# Patient Record
Sex: MALE | Race: BLACK OR AFRICAN AMERICAN | NOT HISPANIC OR LATINO | Employment: FULL TIME | URBAN - METROPOLITAN AREA
[De-identification: names, ages, dates, MRNs, and addresses within clinical notes are randomized per-mention and may not be internally consistent; named-entity substitution may affect disease eponyms.]

---

## 2018-01-16 ENCOUNTER — OFFICE VISIT (OUTPATIENT)
Dept: FAMILY MEDICINE CLINIC | Facility: CLINIC | Age: 23
End: 2018-01-16

## 2018-01-16 VITALS
BODY MASS INDEX: 23.38 KG/M2 | HEART RATE: 64 BPM | WEIGHT: 188 LBS | DIASTOLIC BLOOD PRESSURE: 78 MMHG | SYSTOLIC BLOOD PRESSURE: 128 MMHG | TEMPERATURE: 97.6 F | RESPIRATION RATE: 16 BRPM | OXYGEN SATURATION: 97 % | HEIGHT: 75 IN

## 2018-01-16 DIAGNOSIS — S39.012A BACK STRAIN, INITIAL ENCOUNTER: Primary | ICD-10-CM

## 2018-01-16 PROCEDURE — 99203 OFFICE O/P NEW LOW 30 MIN: CPT | Performed by: FAMILY MEDICINE

## 2018-01-16 RX ORDER — IBUPROFEN 200 MG
200 TABLET ORAL
COMMUNITY
End: 2018-05-08

## 2018-01-16 NOTE — PROGRESS NOTES
"Subjective   Teto Durand is a 22 y.o. male.     History of Present Illness   Chief Complaint:   Chief Complaint   Patient presents with   • Shoulder Pain     Establish care as a new patient       Teto Durand 22 y.o. male who presents today to establish care as a new patient. I reviewed all medical history, surgical history and family history. He complains of right shoulder pain that has been present for 3-4 months. He denies injury. His pain is a 2-3 out of 10 in the office today.  He points to right scapula   When he stands       View from behind back  Appears to be  Off balance looking back   ? Muscle imbalance   Some pain right scapula      From  Right  Shoulder     he has a problem list of   Patient Active Problem List   Diagnosis   • Tear of meniscus of knee   .  Since the last visit, he has overall felt well.  he has been compliant with   Current Outpatient Prescriptions:   •  ibuprofen (ADVIL,MOTRIN) 200 MG tablet, Take 200 mg by mouth., Disp: , Rfl: .  he denies medication side effects.    All of the chronic condition(s) listed above are stable w/o issues.    /78  Pulse 64  Temp 97.6 °F (36.4 °C) (Oral)   Resp 16  Ht 190.5 cm (75\")  Wt 85.3 kg (188 lb)  SpO2 97%  BMI 23.5 kg/m2    No results found for this or any previous visit.    The following portions of the patient's history were reviewed and updated as appropriate: allergies, current medications, past family history, past medical history, past social history, past surgical history and problem list.    Review of Systems   Constitutional: Negative for activity change, appetite change and unexpected weight change.   Eyes: Negative for visual disturbance.   Respiratory: Negative for chest tightness and shortness of breath.    Cardiovascular: Negative for chest pain and palpitations.   Musculoskeletal:        Shoulder pain-right side   Skin: Negative for color change.   Neurological: Negative for syncope and speech difficulty. "   Psychiatric/Behavioral: Negative for confusion and decreased concentration.       Objective   Physical Exam   Constitutional: He is oriented to person, place, and time.   HENT:   Mouth/Throat: Oropharynx is clear and moist.   Neck  From   Good = strength arms    Good    Eyes: Pupils are equal, round, and reactive to light.   Neck: Normal range of motion. Neck supple. No thyromegaly present.   Cardiovascular: Normal rate.    Musculoskeletal: Normal range of motion. He exhibits no tenderness.   ? Muscle imbalance post back   Neurological: He is alert and oriented to person, place, and time. He has normal reflexes.   Skin: No rash noted.   Psychiatric: He has a normal mood and affect. His behavior is normal.       Assessment/Plan   Teto was seen today for shoulder pain.    Diagnoses and all orders for this visit:    Back strain, initial encounter  -     Ambulatory Referral to Orthopedic Surgery

## 2018-01-16 NOTE — PATIENT INSTRUCTIONS
Exercise 30 minutes most days of the week  Sleep 6-8 hours each night if possible  Low fat, low cholesterol diet   we discussed prescribed medications and how to take them   make sure you get results of any labs/studies ordered today  Low glycemic index diet     See dr reyes for consult

## 2018-01-19 ENCOUNTER — OFFICE VISIT (OUTPATIENT)
Dept: ORTHOPEDIC SURGERY | Facility: CLINIC | Age: 23
End: 2018-01-19

## 2018-01-19 VITALS — WEIGHT: 185 LBS | TEMPERATURE: 98.3 F | BODY MASS INDEX: 22.53 KG/M2 | HEIGHT: 76 IN

## 2018-01-19 DIAGNOSIS — M54.2 NECK PAIN: ICD-10-CM

## 2018-01-19 DIAGNOSIS — G89.29 CHRONIC RIGHT SHOULDER PAIN: Primary | ICD-10-CM

## 2018-01-19 DIAGNOSIS — M25.511 CHRONIC RIGHT SHOULDER PAIN: Primary | ICD-10-CM

## 2018-01-19 PROCEDURE — 73030 X-RAY EXAM OF SHOULDER: CPT | Performed by: ORTHOPAEDIC SURGERY

## 2018-01-19 PROCEDURE — 99203 OFFICE O/P NEW LOW 30 MIN: CPT | Performed by: ORTHOPAEDIC SURGERY

## 2018-01-19 NOTE — PROGRESS NOTES
New patient or new problem visit    Chief Complaint   Patient presents with   • Right Shoulder - Pain       HPI: He complains primarily of right shoulder pain but sometimes neck and interscapular pain which is been going on 3 months and is constant aching and associated with clicking in the shoulder.  May have injured it lifting he states.    PFSH: See chart- reviewed    Review of Systems   Constitutional: Negative for chills, fever and unexpected weight change.   HENT: Negative for trouble swallowing and voice change.    Eyes: Negative for visual disturbance.   Respiratory: Negative for cough and shortness of breath.    Cardiovascular: Negative for chest pain and leg swelling.   Gastrointestinal: Negative for abdominal pain, nausea and vomiting.   Endocrine: Negative for cold intolerance and heat intolerance.   Genitourinary: Negative for difficulty urinating, frequency and urgency.   Musculoskeletal: Positive for myalgias.   Skin: Negative for rash and wound.   Allergic/Immunologic: Negative for immunocompromised state.   Neurological: Negative for weakness and numbness.   Hematological: Does not bruise/bleed easily.   Psychiatric/Behavioral: Negative for dysphoric mood. The patient is not nervous/anxious.        PE: On exam some subacromial mild tenderness and equivocal impingement for stable range of motion with minimal crepitus in the right shoulder.  Neurologic function the upper extremity symmetrically intact for motor sensory and reflex testing.  The neck is mildly tender in the right trapezial area.      MEDICAL DECISION MAKING    XRAY: In film x-rays of the shoulder are normal may be slight elevation of the right clavicle.  No comparison views are available.    Other: n/a    Impression: I suspect he has chronic the shoulder separations bilaterally at the before meals joint but I don't think this is the source of his pain that think he has some rotator cuff tendinitis/impingement for which I recommended  exercises.  If he fails to improve then I'll see him back in a month.    Plan: 6

## 2018-04-03 ENCOUNTER — OFFICE VISIT (OUTPATIENT)
Dept: FAMILY MEDICINE CLINIC | Facility: CLINIC | Age: 23
End: 2018-04-03

## 2018-04-03 VITALS
HEIGHT: 76 IN | SYSTOLIC BLOOD PRESSURE: 112 MMHG | TEMPERATURE: 98.2 F | OXYGEN SATURATION: 98 % | WEIGHT: 188 LBS | BODY MASS INDEX: 22.89 KG/M2 | RESPIRATION RATE: 16 BRPM | HEART RATE: 58 BPM | DIASTOLIC BLOOD PRESSURE: 60 MMHG

## 2018-04-03 DIAGNOSIS — G89.29 CHRONIC RIGHT SHOULDER PAIN: Primary | ICD-10-CM

## 2018-04-03 DIAGNOSIS — Z00.00 LABORATORY EXAMINATION ORDERED AS PART OF A ROUTINE GENERAL MEDICAL EXAMINATION: ICD-10-CM

## 2018-04-03 DIAGNOSIS — R63.4 WEIGHT LOSS: ICD-10-CM

## 2018-04-03 DIAGNOSIS — B36.0 TINEA VERSICOLOR: ICD-10-CM

## 2018-04-03 DIAGNOSIS — D70.9 NEUTROPENIA, UNSPECIFIED TYPE (HCC): ICD-10-CM

## 2018-04-03 DIAGNOSIS — M25.511 CHRONIC RIGHT SHOULDER PAIN: Primary | ICD-10-CM

## 2018-04-03 DIAGNOSIS — M25.551 RIGHT HIP PAIN: ICD-10-CM

## 2018-04-03 PROCEDURE — 99214 OFFICE O/P EST MOD 30 MIN: CPT | Performed by: PHYSICIAN ASSISTANT

## 2018-04-03 RX ORDER — KETOCONAZOLE 20 MG/ML
SHAMPOO TOPICAL
Qty: 120 ML | Refills: 5 | Status: SHIPPED | OUTPATIENT
Start: 2018-04-03 | End: 2018-05-08

## 2018-04-03 NOTE — PROGRESS NOTES
Subjective   Teto Durand is a 22 y.o. male.     History of Present Illness   Teot Durand 22 y.o. male who presents today for routine follow up check and medication refills.  he has a history of   Patient Active Problem List   Diagnosis   • Tear of meniscus of knee   .  Since the last visit, he has overall felt fairly well.  He has ortho pain and did see DR. Tanner Mazariegos for shoulder pain and wants to see Dr. Buck..  he has been compliant with current medications have reviewed them.  The patient denies medication side effects.    Having shoulder and hip pain; this is making his back hurt and refer to ortho  Weight is down 20lbs in last 2 years;   He did stop basketball and weight lifting prior to this and has been eating less.   Biceps smaller ? Right upper ext    No results found for this or any previous visit.    Weight up 3 lbs since last weight    See me one month if cont. Weight loss  The following portions of the patient's history were reviewed and updated as appropriate: allergies, current medications, past family history, past medical history, past social history, past surgical history and problem list.    Review of Systems   Constitutional: Positive for unexpected weight change. Negative for activity change and appetite change.   HENT: Negative for nosebleeds and trouble swallowing.    Eyes: Negative for pain and visual disturbance.   Respiratory: Negative for chest tightness, shortness of breath and wheezing.    Cardiovascular: Negative for chest pain and palpitations.   Gastrointestinal: Negative for abdominal pain and blood in stool.   Endocrine: Negative.    Genitourinary: Negative for difficulty urinating and hematuria.   Musculoskeletal: Positive for back pain, neck pain and neck stiffness. Negative for joint swelling.   Skin: Positive for rash. Negative for color change.   Allergic/Immunologic: Negative.    Neurological: Negative for syncope and speech difficulty.   Hematological: Negative for  adenopathy.   Psychiatric/Behavioral: Positive for sleep disturbance. Negative for agitation and confusion.   All other systems reviewed and are negative.      Objective   Physical Exam   Constitutional: He is oriented to person, place, and time. He appears well-developed and well-nourished. No distress.   HENT:   Head: Normocephalic and atraumatic.   Right Ear: External ear normal.   Left Ear: External ear normal.   Nose: Nose normal.   Mouth/Throat: Oropharynx is clear and moist. No oropharyngeal exudate.   Eyes: Conjunctivae and EOM are normal. Pupils are equal, round, and reactive to light. No scleral icterus.   Neck: Normal range of motion. Neck supple. No thyromegaly present.   Cardiovascular: Normal rate, regular rhythm, normal heart sounds and intact distal pulses.    No murmur heard.  Pulmonary/Chest: Effort normal and breath sounds normal. No respiratory distress. He has no wheezes. He has no rales.   Abdominal: Soft. Bowel sounds are normal. He exhibits no distension and no mass. There is no tenderness. There is no guarding.   Musculoskeletal: Normal range of motion. He exhibits no deformity.   Lymphadenopathy:     He has no cervical adenopathy.   Neurological: He is alert and oriented to person, place, and time. He has normal reflexes. Coordination normal.   Skin: Skin is warm and dry. Rash noted.   Hyperpigmented macules on chest and back   Psychiatric: He has a normal mood and affect. His behavior is normal. Judgment and thought content normal.   Nursing note and vitals reviewed.      Assessment/Plan   Teto was seen today for hip pain.    Diagnoses and all orders for this visit:    Chronic right shoulder pain    Right hip pain    Weight loss

## 2018-04-03 NOTE — PATIENT INSTRUCTIONS
Low glycemic index diet  Exercise 30 minutes most days of the week  Make sure you get results on any labs or tests we ordered today  We discussed medications and how to take them as prescribed  Sleep 6-8 hours each night if possible  If you have not signed up for [employer], please activate your code ASAP from your After Visit Summary today    LDL goal <100  LDL goal if heart disease <70  HDL goal >60  Triglyceride goal <150  BP goal =<130/80  Fasting glucose <100    Tinea Versicolor  Tinea versicolor is a common fungal infection of the skin. It causes a rash that appears as light or dark patches on the skin. The rash most often occurs on the chest, back, neck, or upper arms. This condition is more common during warm weather.  Other than affecting how your skin looks, tinea versicolor usually does not cause other problems. In most cases, the infection goes away in a few weeks with treatment. It may take a few months for the patches on your skin to clear up.  What are the causes?  Tinea versicolor occurs when a type of fungus that is normally present on the skin starts to overgrow. This fungus is a kind of yeast. The exact cause of the overgrowth is not known. This condition cannot be passed from one person to another (noncontagious).  What increases the risk?  This condition is more likely to develop when certain factors are present, such as:  · Heat and humidity.  · Sweating too much.  · Hormone changes.  · Oily skin.  · A weak defense (immune) system.  What are the signs or symptoms?  Symptoms of this condition may include:  · A rash on your skin that is made up of light or dark patches. The rash may have:  ¨ Patches of tan or pink spots on light skin.  ¨ Patches of white or brown spots on dark skin.  ¨ Patches of skin that do not tan.  ¨ Well-marked edges.  ¨ Scales on the discolored areas.  · Mild itching.  How is this diagnosed?  A health care provider can usually diagnose this condition by looking at your skin.  During the exam, he or she may use ultraviolet light to help determine the extent of the infection. In some cases, a skin sample may be taken by scraping the rash. This sample will be viewed under a microscope to check for yeast overgrowth.  How is this treated?  Treatment for this condition may include:  · Dandruff shampoo that is applied to the affected skin during showers or bathing.  · Over-the-counter medicated skin cream, lotion, or soaps.  · Prescription antifungal medicine in the form of skin cream or pills.  · Medicine to help reduce itching.  Follow these instructions at home:  · Take medicines only as directed by your health care provider.  · Apply dandruff shampoo to the affected area if told to do so by your health care provider. You may be instructed to scrub the affected skin for several minutes each day.  · Do not scratch the affected area of skin.  · Avoid hot and humid conditions.  · Do not use tanning booths.  · Try to avoid sweating a lot.  Contact a health care provider if:  · Your symptoms get worse.  · You have a fever.  · You have redness, swelling, or pain at the site of your rash.  · You have fluid, blood, or pus coming from your rash.  · Your rash returns after treatment.  This information is not intended to replace advice given to you by your health care provider. Make sure you discuss any questions you have with your health care provider.  Document Released: 12/15/2001 Document Revised: 08/20/2017 Document Reviewed: 09/29/2015  [employer] Interactive Patient Education © 2017 [employer]

## 2018-04-04 LAB
ALBUMIN SERPL-MCNC: 4.3 G/DL (ref 3.5–5.2)
ALBUMIN/GLOB SERPL: 1.5 G/DL
ALP SERPL-CCNC: 96 U/L (ref 39–117)
ALT SERPL-CCNC: 22 U/L (ref 1–41)
APPEARANCE UR: CLEAR
AST SERPL-CCNC: 18 U/L (ref 1–40)
BACTERIA #/AREA URNS HPF: ABNORMAL /HPF
BASOPHILS # BLD AUTO: 0.02 10*3/MM3 (ref 0–0.2)
BASOPHILS NFR BLD AUTO: 0.6 % (ref 0–1.5)
BILIRUB SERPL-MCNC: 0.2 MG/DL (ref 0.1–1.2)
BILIRUB UR QL STRIP: NEGATIVE
BUN SERPL-MCNC: 13 MG/DL (ref 6–20)
BUN/CREAT SERPL: 12.3 (ref 7–25)
CALCIUM SERPL-MCNC: 9.8 MG/DL (ref 8.6–10.5)
CASTS URNS MICRO: ABNORMAL
CHLORIDE SERPL-SCNC: 104 MMOL/L (ref 98–107)
CHOLEST SERPL-MCNC: 156 MG/DL (ref 0–200)
CO2 SERPL-SCNC: 28.6 MMOL/L (ref 22–29)
COLOR UR: YELLOW
CREAT SERPL-MCNC: 1.06 MG/DL (ref 0.76–1.27)
EOSINOPHIL # BLD AUTO: 0.06 10*3/MM3 (ref 0–0.7)
EOSINOPHIL NFR BLD AUTO: 1.7 % (ref 0.3–6.2)
EPI CELLS #/AREA URNS HPF: ABNORMAL /HPF
ERYTHROCYTE [DISTWIDTH] IN BLOOD BY AUTOMATED COUNT: 13.5 % (ref 11.5–14.5)
FOLATE SERPL-MCNC: 16.01 NG/ML (ref 4.78–24.2)
GFR SERPLBLD CREATININE-BSD FMLA CKD-EPI: 107 ML/MIN/1.73
GFR SERPLBLD CREATININE-BSD FMLA CKD-EPI: 88 ML/MIN/1.73
GLOBULIN SER CALC-MCNC: 2.9 GM/DL
GLUCOSE SERPL-MCNC: 96 MG/DL (ref 65–99)
GLUCOSE UR QL: NEGATIVE
HAV IGM SERPL QL IA: NEGATIVE
HBV CORE IGM SERPL QL IA: NEGATIVE
HBV SURFACE AG SERPL QL IA: NEGATIVE
HCT VFR BLD AUTO: 42.4 % (ref 40.4–52.2)
HCV AB S/CO SERPL IA: <0.1 S/CO RATIO (ref 0–0.9)
HDLC SERPL-MCNC: 46 MG/DL (ref 40–60)
HGB BLD-MCNC: 14 G/DL (ref 13.7–17.6)
HGB UR QL STRIP: NEGATIVE
HIV 1+2 AB+HIV1 P24 AG SERPL QL IA: NON REACTIVE
IMM GRANULOCYTES # BLD: 0 10*3/MM3 (ref 0–0.03)
IMM GRANULOCYTES NFR BLD: 0 % (ref 0–0.5)
KETONES UR QL STRIP: NEGATIVE
LDLC SERPL CALC-MCNC: 97 MG/DL (ref 0–100)
LEUKOCYTE ESTERASE UR QL STRIP: NEGATIVE
LYMPHOCYTES # BLD AUTO: 1.84 10*3/MM3 (ref 0.9–4.8)
LYMPHOCYTES NFR BLD AUTO: 53.5 % (ref 19.6–45.3)
MCH RBC QN AUTO: 30.4 PG (ref 27–32.7)
MCHC RBC AUTO-ENTMCNC: 33 G/DL (ref 32.6–36.4)
MCV RBC AUTO: 92.2 FL (ref 79.8–96.2)
MONOCYTES # BLD AUTO: 0.32 10*3/MM3 (ref 0.2–1.2)
MONOCYTES NFR BLD AUTO: 9.3 % (ref 5–12)
NEUTROPHILS # BLD AUTO: 1.2 10*3/MM3 (ref 1.9–8.1)
NEUTROPHILS NFR BLD AUTO: 34.9 % (ref 42.7–76)
NITRITE UR QL STRIP: NEGATIVE
PH UR STRIP: 6 [PH] (ref 5–8)
PLATELET # BLD AUTO: 240 10*3/MM3 (ref 140–500)
POTASSIUM SERPL-SCNC: 4.6 MMOL/L (ref 3.5–5.2)
PROT SERPL-MCNC: 7.2 G/DL (ref 6–8.5)
PROT UR QL STRIP: (no result)
RBC # BLD AUTO: 4.6 10*6/MM3 (ref 4.6–6)
RBC #/AREA URNS HPF: ABNORMAL /HPF
SODIUM SERPL-SCNC: 144 MMOL/L (ref 136–145)
SP GR UR: 1.03 (ref 1–1.03)
T3FREE SERPL-MCNC: 2.8 PG/ML (ref 2–4.4)
T4 FREE SERPL-MCNC: 1.26 NG/DL (ref 0.93–1.7)
TRIGL SERPL-MCNC: 63 MG/DL (ref 0–150)
TSH SERPL DL<=0.005 MIU/L-ACNC: 2.23 MIU/ML (ref 0.27–4.2)
UROBILINOGEN UR STRIP-MCNC: (no result) MG/DL
VIT B12 SERPL-MCNC: 650 PG/ML (ref 211–946)
VLDLC SERPL CALC-MCNC: 12.6 MG/DL (ref 5–40)
WBC # BLD AUTO: 3.44 10*3/MM3 (ref 4.5–10.7)
WBC #/AREA URNS HPF: ABNORMAL /HPF

## 2018-04-19 ENCOUNTER — TELEPHONE (OUTPATIENT)
Dept: ORTHOPEDIC SURGERY | Facility: CLINIC | Age: 23
End: 2018-04-19

## 2018-05-08 ENCOUNTER — OFFICE VISIT (OUTPATIENT)
Dept: ORTHOPEDIC SURGERY | Facility: CLINIC | Age: 23
End: 2018-05-08

## 2018-05-08 VITALS — BODY MASS INDEX: 22.89 KG/M2 | WEIGHT: 188 LBS | TEMPERATURE: 97.5 F | HEIGHT: 76 IN

## 2018-05-08 DIAGNOSIS — IMO0002 BURSITIS/TENDONITIS, SHOULDER: Primary | ICD-10-CM

## 2018-05-08 PROCEDURE — 99214 OFFICE O/P EST MOD 30 MIN: CPT | Performed by: ORTHOPAEDIC SURGERY

## 2018-05-08 NOTE — PROGRESS NOTES
"Right Shoulder Follow Up      Patient: Teto Durand        YOB: 1995            Chief Complaints:right Shoulder pain  Chief Complaint   Patient presents with   • Right Shoulder - Pain, Follow-up   • Cervical Spine - Pain, Follow-up         History of Present Illness:This is a 22-year-old right-hand dominant young man in presents complaining of right shoulder pain is been ongoing 5 months no one history injury change in activity he has been lifting some is unsure if that's related.  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  Taken occasional ibuprofen he has significant night pain which is his biggest complaint he has seen Dr. Griffin who did not feel like it was significantly neck and wanted his shoulder evaluated.  His symptoms are constant severe aching with clicking popping snapping past medical history is unremarkable 14 point review      Physical Exam: 22 y.o. male  General Appearance:    Alert, cooperative, in no acute distress                   Vitals:    05/08/18 1251   Temp: 97.5 °F (36.4 °C)   Weight: 85.3 kg (188 lb)   Height: 193 cm (76\")        Patient is alert and read ×3 no acute distress appears her above-listed at height weight and age.  Affect is normal respiratory rate is normal unlabored. Heart rate regular rate rhythm, sclera, dentition and hearing are normal for the purpose of this exam.      Ortho Exam              Assessment/Plan:                "

## 2018-05-09 NOTE — PROGRESS NOTES
New Shoulder      Patient: Teto Durand        YOB: 1995    Medical Record Number: [id number]        Chief Complaints: Right shoulder pain      History of Present Illness:   History of Present Illness:This is a 22-year-old right-hand dominant young man in presents complaining of right shoulder pain is been ongoing 5 months no one history injury change in activity he has been lifting some is unsure if that's related.  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  Taken occasional ibuprofen he has significant night pain which is his biggest complaint he has seen Dr. Griffin who did not feel like it was significantly neck and wanted his shoulder evaluated.  His symptoms are constant severe aching with clicking popping snapping past medical history is unremarkable 14 point review        Allergies: No Known Allergies    Medications:   Home Medications:  No current outpatient prescriptions on file prior to visit.     No current facility-administered medications on file prior to visit.      Current Medications:  Scheduled Meds:  Continuous Infusions:  No current facility-administered medications for this visit.   PRN Meds:.    History reviewed. No pertinent past medical history.     Past Surgical History:   Procedure Laterality Date   • KNEE CARTILAGE SURGERY Right    • KNEE SURGERY          Social History     Occupational History   • Not on file.     Social History Main Topics   • Smoking status: Never Smoker   • Smokeless tobacco: Never Used   • Alcohol use Yes   • Drug use: Unknown   • Sexual activity: Defer    Social History     Social History Narrative   • No narrative on file      History reviewed. No pertinent family history.          Review of Systems: 14 point review of systems are remarkable for the pertinent positives listed in the chart by the patient the remainder are negative.  He's had as had recently had  "blood in his urine which is currently being worked up.    Review of Systems      Physical Exam: 22 y.o. male  General Appearance:    Alert, cooperative, in no acute distress                 Vitals:    05/08/18 1251   Temp: 97.5 °F (36.4 °C)   Weight: 85.3 kg (188 lb)   Height: 193 cm (76\")      Patient is alert and read ×3 no acute distress appears her above-listed at height weight and age.  Affect is normal respiratory rate is normal unlabored. Heart rate regular rate rhythm, sclera, dentition and hearing are normal for the purpose of this exam.    Ortho Exam Physical exam of the right shoulder reveals no overlying skin changes no lymphedema no lymphadenopathy.  Patient has active flexion 180 with mild symptoms abduction is similar external rotation is to 50 and internal rotation to the upper lumbar spine with mild symptoms.  Patient has good rotator cuff strength 4+ over 5 with isometric strength testing with pain.  Patient has a positive impingement and a positive Garza sign.  Patient has good cervical range of motion which is full and asymptomatic no radicular symptoms.  Patient has a normal elbow exam.  Good distal pulses are present  Patient has pain with overhead activity and a positive Neer sign and a positive empty can sign , a positive drop arm and a definitive painful arc  He has extremely poor scapular stabilization and continually is moving his shoulder popping his neck and do an odd things that he states is trying to \"pop in his shoulder and his neck\"  Procedures          Radiology:   AP, Scapular Y and Axillary Lateral of the right shoulder were ordered/reviewed to evauate shoulder pain.  I've no comparative films these were taken by Dr. Griffin these show a slightly high riding clavicle with respect to the acromium no acute bony pathology  Imaging Results (most recent)     None        Assessment/Plan:Right shoulder pain I think this is secondary impingement due to extremely poor scapular " stabilization and very poor dynamics and biomechanics of the shoulder plan is to proceed with physical therapy but I would also like to get an MRI in view of his severe night pain  If we fail to improve his symptoms addressing his shoulder I would have him see Dr. Griffin again

## 2018-05-14 ENCOUNTER — APPOINTMENT (OUTPATIENT)
Dept: PHYSICAL THERAPY | Facility: HOSPITAL | Age: 23
End: 2018-05-14
Attending: ORTHOPAEDIC SURGERY

## 2018-05-16 ENCOUNTER — HOSPITAL ENCOUNTER (OUTPATIENT)
Dept: MRI IMAGING | Facility: HOSPITAL | Age: 23
Discharge: HOME | End: 2018-05-16
Attending: ORTHOPAEDIC SURGERY | Admitting: ORTHOPAEDIC SURGERY

## 2018-05-16 DIAGNOSIS — IMO0002 BURSITIS/TENDONITIS, SHOULDER: ICD-10-CM

## 2018-05-16 PROCEDURE — 73221 MRI JOINT UPR EXTREM W/O DYE: CPT

## 2018-05-21 DIAGNOSIS — M54.2 NECK PAIN: Primary | ICD-10-CM

## 2018-07-17 ENCOUNTER — OFFICE VISIT (OUTPATIENT)
Dept: ORTHOPEDIC SURGERY | Facility: CLINIC | Age: 23
End: 2018-07-17

## 2018-07-17 VITALS — BODY MASS INDEX: 22.65 KG/M2 | WEIGHT: 186 LBS | TEMPERATURE: 99.5 F | HEIGHT: 76 IN

## 2018-07-17 DIAGNOSIS — M25.551 BILATERAL HIP PAIN: Primary | ICD-10-CM

## 2018-07-17 DIAGNOSIS — M25.552 BILATERAL HIP PAIN: Primary | ICD-10-CM

## 2018-07-17 PROCEDURE — 99213 OFFICE O/P EST LOW 20 MIN: CPT | Performed by: ORTHOPAEDIC SURGERY

## 2018-07-17 PROCEDURE — 73521 X-RAY EXAM HIPS BI 2 VIEWS: CPT | Performed by: ORTHOPAEDIC SURGERY

## 2018-07-17 NOTE — PROGRESS NOTES
"Patient Name: Teto Durand   YOB: 1995  Referring Primary Care Physician: Jignesh Saez MD  BMI: Body mass index is 22.64 kg/m².    Chief Complaint:    Chief Complaint   Patient presents with   • Right Hip - Establish Care, Pain    left hip    Subjective:    HPI:   Teto Durand is a pleasant 23 y.o. year old who presents today for evaluation of   Chief Complaint   Patient presents with   • Right Hip - Establish Care, Pain    (patient had CT at urology and spotted lesion in the left femoral neck.  No prior knowledge.  No night pain, trauma or previous hx he can think of.  Has left more than right \"hip\" pain but points to his posterior ilium and low back.  Athlete in high school.  In school now.  No meds etc and was incidental finding  This problem is new to this examiner.     Medications:   Home Medications:  No current outpatient prescriptions on file prior to visit.     No current facility-administered medications on file prior to visit.      Current Medications:  Scheduled Meds:  Continuous Infusions:  No current facility-administered medications for this visit.   PRN Meds:.    I have reviewed the patient's medical history in detail and updated the computerized patient record.  Review and summarization of old records includes:    History reviewed. No pertinent past medical history.     Past Surgical History:   Procedure Laterality Date   • KNEE CARTILAGE SURGERY Right    • KNEE SURGERY          Social History     Occupational History   • Not on file.     Social History Main Topics   • Smoking status: Never Smoker   • Smokeless tobacco: Never Used   • Alcohol use Yes   • Drug use: Unknown   • Sexual activity: Defer    Social History     Social History Narrative   • No narrative on file      History reviewed. No pertinent family history.    ROS: 14 point review of systems was performed and all other systems were reviewed and are negative except for documented findings in HPI and today's encounter. " "    Allergies: No Known Allergies  Constitutional:  Denies fever, shaking or chills   Eyes:  Denies change in visual acuity   HENT:  Denies nasal congestion or sore throat   Respiratory:  Denies cough or shortness of breath   Cardiovascular:  Denies chest pain or severe LE edema   GI:  Denies abdominal pain, nausea, vomiting, bloody stools or diarrhea   Musculoskeletal:  Numbness, tingling, pain, or loss of motor function only as noted above in history of present illness.  : Denies painful urination or hematuria  Integument:  Denies rash, lesion or ulceration   Neurologic:  Denies headache or focal weakness  Endocrine:  Denies lymphadenopathy  Psych:  Denies confusion or change in mental status   Hem:  Denies active bleeding    Subjective     Objective:    Physical Exam: 23 y.o. male  Wt Readings from Last 3 Encounters:   07/17/18 84.4 kg (186 lb)   05/16/18 77.1 kg (170 lb)   05/08/18 85.3 kg (188 lb)     Ht Readings from Last 3 Encounters:   07/17/18 193 cm (76\")   05/16/18 184 cm (72.44\")   05/08/18 193 cm (76\")     Body mass index is 22.64 kg/m².    Vitals:    07/17/18 1129   Temp: 99.5 °F (37.5 °C)       Vital signs reviewed.   General Appearance:    Alert, cooperative, in no acute distress                  Eyes: conjunctiva clear  ENT: external ears and nose atraumatic  CV: no peripheral edema  Resp: normal respiratory effort  Skin: no rashes or wounds; normal turgor  Psych: mood and affect appropriate  Lymph: no nodes appreciated  Neuro: gross sensation intact  Vascular:  Palpable peripheral pulse in noted extremity  Musculoskeletal Extremities: benson hi stenchfield neg, good rom with slight stiffness to roattion left more than right, distal nvi, FADIR neg.  neg to palp, reports no night pain  Viewed CT from urology that shows same.    Radiology:   Imaging done today and discussed at length with the patient:    Indication: pain related symptoms,  Views: 2V AP&LAT bilateral hip(s)   Findings: round smooth " large nonexpansile lesions in the left femoral neck region; ? lucency on the right.    Comparison views: not available      Assessment:     ICD-10-CM ICD-9-CM   1. Bilateral hip pain M25.551 719.45    M25.552         Procedures       Plan: Biomechanics of pertinent body area discussed.  Risks, benefits, alternatives, comparisons, and complications of accepted medicines, injections, recommendations, surgical procedures, and therapies explained and education provided in laymen's terms. The patient was given the opportunity to ask questions and they were answerved to their satisfaction.   Natural history and expected course of this patient's diagnosis discussed along with evaluation of therapies. Questions answered.  MRI.with and without benson hips to assess.  Told them may require more workup/ treatments depending on the findings        7/17/2018

## 2018-07-30 ENCOUNTER — HOSPITAL ENCOUNTER (OUTPATIENT)
Dept: MRI IMAGING | Facility: HOSPITAL | Age: 23
Discharge: HOME | End: 2018-07-30
Attending: ORTHOPAEDIC SURGERY | Admitting: ORTHOPAEDIC SURGERY

## 2018-07-30 ENCOUNTER — HOSPITAL ENCOUNTER (OUTPATIENT)
Dept: MRI IMAGING | Facility: HOSPITAL | Age: 23
Discharge: HOME | End: 2018-07-30
Attending: ORTHOPAEDIC SURGERY

## 2018-07-30 DIAGNOSIS — M25.551 BILATERAL HIP PAIN: ICD-10-CM

## 2018-07-30 DIAGNOSIS — M25.552 BILATERAL HIP PAIN: ICD-10-CM

## 2018-07-30 PROCEDURE — 73723 MRI JOINT LWR EXTR W/O&W/DYE: CPT

## 2018-07-30 PROCEDURE — A9577 INJ MULTIHANCE: HCPCS | Performed by: ORTHOPAEDIC SURGERY

## 2018-07-30 PROCEDURE — 0 GADOBENATE DIMEGLUMINE 529 MG/ML SOLUTION: Performed by: ORTHOPAEDIC SURGERY

## 2018-07-30 RX ADMIN — GADOBENATE DIMEGLUMINE 17 ML: 529 INJECTION, SOLUTION INTRAVENOUS at 13:01

## 2018-08-01 DIAGNOSIS — M25.552 LEFT HIP PAIN: Primary | ICD-10-CM

## 2018-08-01 PROBLEM — M25.551 BILATERAL HIP PAIN: Status: ACTIVE | Noted: 2018-08-01

## 2018-11-07 ENCOUNTER — TELEPHONE (OUTPATIENT)
Dept: ORTHOPEDIC SURGERY | Facility: CLINIC | Age: 23
End: 2018-11-07

## 2019-02-04 ENCOUNTER — OFFICE VISIT (OUTPATIENT)
Dept: RETAIL CLINIC | Facility: CLINIC | Age: 24
End: 2019-02-04

## 2019-02-04 VITALS
OXYGEN SATURATION: 98 % | DIASTOLIC BLOOD PRESSURE: 77 MMHG | SYSTOLIC BLOOD PRESSURE: 125 MMHG | TEMPERATURE: 98.3 F | HEART RATE: 66 BPM

## 2019-02-04 DIAGNOSIS — B86 SCABIES: Primary | ICD-10-CM

## 2019-02-04 PROCEDURE — 99213 OFFICE O/P EST LOW 20 MIN: CPT | Performed by: NURSE PRACTITIONER

## 2019-02-04 RX ORDER — PERMETHRIN 50 MG/G
CREAM TOPICAL ONCE
Qty: 60 G | Refills: 1 | Status: SHIPPED | OUTPATIENT
Start: 2019-02-04 | End: 2019-02-04

## 2019-02-04 NOTE — PROGRESS NOTES
Subjective   Teto Durand is a 23 y.o. male.     Rash   This is a new problem. The current episode started 1 to 4 weeks ago (about a month). The problem has been gradually worsening since onset. The rash is diffuse (worse thighs, hands, feet, between fingers and toes). The rash is characterized by itchiness. He was exposed to nothing. Pertinent negatives include no congestion, cough, fever or sore throat. Past treatments include nothing.        The following portions of the patient's history were reviewed and updated as appropriate: allergies, current medications, past family history, past medical history, past social history, past surgical history and problem list.    Review of Systems   Constitutional: Negative for fever.   HENT: Negative for congestion and sore throat.    Respiratory: Negative for cough.    Cardiovascular: Negative.    Gastrointestinal: Negative.    Skin: Positive for rash and skin lesions.   Neurological: Positive for headache.       Objective   Physical Exam   Constitutional: He is cooperative. No distress.   HENT:   Head: Normocephalic.   Right Ear: Hearing, tympanic membrane, external ear and ear canal normal.   Left Ear: Hearing, tympanic membrane, external ear and ear canal normal.   Nose: Nose normal.   Mouth/Throat: Oropharynx is clear and moist.   Eyes: Conjunctivae, EOM and lids are normal. Pupils are equal, round, and reactive to light.   Neck: Trachea normal and full passive range of motion without pain.   Cardiovascular: Normal rate, regular rhythm and normal pulses.   Pulmonary/Chest: Effort normal and breath sounds normal.   Neurological: He is alert.   Skin: Skin is warm. Capillary refill takes less than 2 seconds. Rash noted. Rash is vesicular and urticarial.   Linear red bumps on bilateral upper arms and thighs, red raised areas in between fingers and toes bialterally.   Psychiatric: He has a normal mood and affect. His speech is normal and behavior is normal.   Vitals  reviewed.        Assessment/Plan   Teto was seen today for rash.    Diagnoses and all orders for this visit:    Scabies    Other orders  -     permethrin (ELIMITE) 5 % cream; Apply  topically to the appropriate area as directed 1 (One) Time for 1 dose. Apply to entire body today as directed then repeat in 48 hr

## 2021-06-11 ENCOUNTER — OFFICE VISIT (OUTPATIENT)
Dept: ORTHOPEDIC SURGERY | Facility: CLINIC | Age: 26
End: 2021-06-11

## 2021-06-11 VITALS — WEIGHT: 286 LBS | HEIGHT: 76 IN | TEMPERATURE: 96.8 F | BODY MASS INDEX: 34.83 KG/M2

## 2021-06-11 DIAGNOSIS — M89.9 LESION OF LEFT FEMUR: ICD-10-CM

## 2021-06-11 DIAGNOSIS — S39.012A LUMBAR STRAIN, INITIAL ENCOUNTER: ICD-10-CM

## 2021-06-11 DIAGNOSIS — S76.011A HIP STRAIN, RIGHT, INITIAL ENCOUNTER: ICD-10-CM

## 2021-06-11 DIAGNOSIS — S16.1XXA STRAIN OF NECK MUSCLE, INITIAL ENCOUNTER: ICD-10-CM

## 2021-06-11 DIAGNOSIS — R52 PAIN: Primary | ICD-10-CM

## 2021-06-11 PROBLEM — S72.009A FEMORAL NECK FRACTURE (HCC): Status: ACTIVE | Noted: 2021-06-11

## 2021-06-11 PROBLEM — S72.009A FEMORAL NECK FRACTURE (HCC): Status: RESOLVED | Noted: 2021-06-11 | Resolved: 2021-06-11

## 2021-06-11 PROCEDURE — 73501 X-RAY EXAM HIP UNI 1 VIEW: CPT | Performed by: ORTHOPAEDIC SURGERY

## 2021-06-11 PROCEDURE — 99213 OFFICE O/P EST LOW 20 MIN: CPT | Performed by: ORTHOPAEDIC SURGERY

## 2021-06-11 NOTE — PROGRESS NOTES
"Patient Name: Teto Durand   YOB: 1995  Referring Primary Care Physician: Jignesh Saez MD  BMI: Body mass index is 34.81 kg/m².    Chief Complaint:    Chief Complaint   Patient presents with   • Right Hip - Pain, Initial Evaluation, Edema        HPI:     Teto Durand is a 25 y.o. male who presents today for evaluation of   Chief Complaint   Patient presents with   • Right Hip - Pain, Initial Evaluation, Edema   . He states that it is as if \"I have a nervous twitch\" as if trying to straighten his back and shoulder. The patient adds he has pain in the groin and surrounding area, as well as in his neck. He denies having surgery regarding a previous cyst. The patient reports that he will take over the counter anti-inflammatory medication.     Additionally, he reports he stands for 10 or more hours each day at his job, as well as moving constantly. His pain has affected him to needing to leave work.       Subjective   Medications:   Home Medications:  No current outpatient medications on file prior to visit.     No current facility-administered medications on file prior to visit.     Current Medications:  Scheduled Meds:  Continuous Infusions:No current facility-administered medications for this visit.    PRN Meds:.    I have reviewed the patient's medical history in detail and updated the computerized patient record.  Review and summarization of old records includes:    History reviewed. No pertinent past medical history.     Past Surgical History:   Procedure Laterality Date   • KNEE CARTILAGE SURGERY Right    • KNEE SURGERY          Social History     Occupational History   • Not on file   Tobacco Use   • Smoking status: Never Smoker   • Smokeless tobacco: Never Used   Substance and Sexual Activity   • Alcohol use: Yes   • Drug use: Defer   • Sexual activity: Defer      Social History     Social History Narrative   • Not on file      History reviewed. No pertinent family history.    ROS: 14 point " "review of systems was performed and all other systems were reviewed and are negative except for documented findings in HPI and today's encounter.     Allergies: No Known Allergies  Constitutional:  Denies fever, shaking or chills   Eyes:  Denies change in visual acuity   HENT:  Denies nasal congestion or sore throat   Respiratory:  Denies cough or shortness of breath   Cardiovascular:  Denies chest pain or severe LE edema   GI:  Denies abdominal pain, nausea, vomiting, bloody stools or diarrhea   Musculoskeletal:  Numbness, tingling, pain, or loss of motor function only as noted above in history of present illness.  : Denies painful urination or hematuria  Integument:  Denies rash, lesion or ulceration   Neurologic:  Denies headache or focal weakness  Endocrine:  Denies lymphadenopathy  Psych:  Denies confusion or change in mental status   Hem:  Denies active bleeding    OBJECTIVE:  Physical Exam: 25 y.o. male  Wt Readings from Last 3 Encounters:   06/11/21 130 kg (286 lb)   07/17/18 84.4 kg (186 lb)   05/16/18 77.1 kg (170 lb)     Ht Readings from Last 1 Encounters:   06/11/21 193 cm (76\")     Body mass index is 34.81 kg/m².  Vitals:    06/11/21 1120   Temp: 96.8 °F (36 °C)     Vital signs reviewed.     General Appearance:    Alert, cooperative, in no acute distress                  Eyes: conjunctiva clear  ENT: external ears and nose atraumatic  CV: no peripheral edema  Resp: normal respiratory effort  Skin: no rashes or wounds; normal turgor  Psych: mood and affect appropriate  Lymph: no nodes appreciated  Neuro: gross sensation intact  Vascular:  Palpable peripheral pulse in noted extremity  Musculoskeletal Extremities: Shoulders have excellent range of motion and strength. There is no signs of instability. 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 His Stinchfield is negative bilaterally. He has " good range of motion in his hips. His flexion, abduction, and external rotation is tighter on the right than on the left and he does have some changes in his lumbar spine and sacroiliac joint that are consistent with chronic muscular strain. He is moving parts from 15 to 30 pounds about once per minute and twisting, and I believe this is aggravating it.     Radiology:   Two views of the bilateral hips, including AP and lateral right hip views, were obtained and reviewed in the office today for pain. With comparison views from a few years prior, these demonstrated that he has a lesion in his left femoral neck. It appears essentially the same as it was 3 years ago and has not increased in size or is he having symptoms related to this.       Assessment:     ICD-10-CM ICD-9-CM   1. Pain  R52 780.96   2. Strain of neck muscle, initial encounter  S16.1XXA 847.0   3. Lumbar strain, initial encounter  S39.012A 847.2   4. Hip strain, right, initial encounter  S76.011A 843.9        MDM/Plan:   The diagnosis(es), natural history, pathophysiology and treatment for diagnosis(es) were discussed. Opportunity given and questions answered.  Biomechanics of pertinent body areas discussed.  When appropriate, the use of ambulatory aids discussed.    He has strains from repetitive injuries in these areas. I would like for him to work on his range of motion, stretching, and strength to see if he can improve his flexibility. He can seek a chiropractor if he wishes. If he does not see improvement additional tests can be ordered if necessary.     If he needs to follow-up we will obtain x-rays at the area of pain.     EXERCISES:  Advice on benefits of, and types of regular/moderate exercise pertaining to orthopedic diagnosis(es).  MEDICATIONS:  The risks, benefits, warnings,side effects and alternatives of medications discussed.  Inflammation/pain control; with cold, heat, elevation and/or liniments discussed as appropriate  PT  referral.    Scribed for Uriel Bach MD by Yari Parikh.  06/11/21   15:26 EDT    I have personally performed the services described in this document as scribed by the above individual, and it is both accurate and complete.  Uriel Bach MD  6/11/2021  15:39 EDT

## 2024-10-31 ENCOUNTER — TELEPHONE (OUTPATIENT)
Dept: URGENT CARE | Facility: CLINIC | Age: 29
End: 2024-10-31
Payer: COMMERCIAL

## 2024-10-31 NOTE — TELEPHONE ENCOUNTER
Patient called stating he was seen in office on 10/22/24. States he is now breaking out in a rash. Notified patient he was welcome to come back in and be seen.

## 2025-02-25 ENCOUNTER — OFFICE VISIT (OUTPATIENT)
Dept: FAMILY MEDICINE CLINIC | Facility: CLINIC | Age: 30
End: 2025-02-25
Payer: COMMERCIAL

## 2025-02-25 VITALS
SYSTOLIC BLOOD PRESSURE: 138 MMHG | OXYGEN SATURATION: 98 % | TEMPERATURE: 96.3 F | WEIGHT: 191.6 LBS | HEART RATE: 57 BPM | BODY MASS INDEX: 23.82 KG/M2 | HEIGHT: 75 IN | DIASTOLIC BLOOD PRESSURE: 80 MMHG

## 2025-02-25 DIAGNOSIS — M25.531 RIGHT WRIST PAIN: Primary | ICD-10-CM

## 2025-02-25 DIAGNOSIS — M25.511 CHRONIC RIGHT SHOULDER PAIN: ICD-10-CM

## 2025-02-25 DIAGNOSIS — G89.29 CHRONIC RIGHT SHOULDER PAIN: ICD-10-CM

## 2025-02-25 DIAGNOSIS — M54.2 NECK PAIN: ICD-10-CM

## 2025-02-25 DIAGNOSIS — M79.641 RIGHT HAND PAIN: ICD-10-CM

## 2025-02-25 PROCEDURE — 99213 OFFICE O/P EST LOW 20 MIN: CPT | Performed by: NURSE PRACTITIONER

## 2025-02-25 RX ORDER — CYCLOBENZAPRINE HCL 5 MG
5 TABLET ORAL 3 TIMES DAILY PRN
Qty: 30 TABLET | Refills: 1 | Status: SHIPPED | OUTPATIENT
Start: 2025-02-25

## 2025-02-25 NOTE — PROGRESS NOTES
Chief Complaint  Shoulder Pain (Right ), Wrist Pain (Right ), and Establish Care    Subjective        Wrist Pain   Pertinent negatives include no fever.      Teto presents to [facility] as a 29 year old male to establish care and to follow up on ongoing right wrist and hand pain.  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  He does alternate ice and heat and has tried NSAIDs with minimal symptom improvement  Pain is worse with any lifting or twisting of the hand and wrist      Denies any other significant personal or family medical history    He has no other acute complaints today    The following portions of the patient's history were reviewed and updated as appropriate: allergies, current medications, past family history, past medical history, past social history, past surgical history, and problem list       Review of Systems   Constitutional:  Negative for chills, fatigue and fever.   Eyes:  Negative for visual disturbance.   Respiratory:  Negative for cough, shortness of breath, wheezing and stridor.    Cardiovascular:  Negative for chest pain and leg swelling.   Gastrointestinal:  Negative for abdominal pain, diarrhea, nausea and vomiting.   Musculoskeletal:  Positive for arthralgias.   Neurological:  Negative for dizziness.   Psychiatric/Behavioral:  Negative for self-injury, sleep disturbance and suicidal ideas. The patient is not nervous/anxious.         Objective   Vital Signs:   Vitals:    02/25/25 1308   BP: 138/80   Pulse: 57   Temp: 96.3 °F (35.7 °C)   SpO2: 98%   Weight: 86.9 kg  "(191 lb 9.6 oz)   Height: 190.5 cm (75\")   PainSc: 7    PainLoc: Shoulder            2/25/2025     1:10 PM   PHQ-2/PHQ-9 Depression Screening   Little interest or pleasure in doing things Over half   Feeling down, depressed, or hopeless Over half   Trouble falling or staying asleep, or sleeping too much Over half   Feeling tired or having little energy Over half   Poor appetite or overeating Not at all   Feeling bad about yourself - or that you are a failure or have let yourself or your family down Not at all   Trouble concentrating on things, such as reading the newspaper or watching television Over half   Moving or speaking so slowly that other people could have noticed? Or the opposite - being so fidgety or restless that you have been moving around a lot more than usual. Over half   Thoughts that you would be better off dead or hurting yourself in some way Not at all   Patient Health Questionnaire-9 Score 12   How difficult have these problems made it for you to do your work, take care of things at home, or get along with other people? Extremely difficult       BMI is within normal parameters. No other follow-up for BMI required.        Physical Exam  Vitals reviewed.   Constitutional:       General: He is not in acute distress.  Eyes:      Conjunctiva/sclera: Conjunctivae normal.   Neck:      Thyroid: No thyromegaly.      Vascular: No carotid bruit.   Cardiovascular:      Rate and Rhythm: Normal rate and regular rhythm.      Heart sounds: Normal heart sounds.   Pulmonary:      Effort: Pulmonary effort is normal.      Breath sounds: Normal breath sounds.   Musculoskeletal:      Right hand: Tenderness present. No swelling, deformity, lacerations or bony tenderness. Normal range of motion. Normal strength. Normal sensation. There is no disruption of two-point discrimination. Normal capillary refill. Normal pulse.        Arms:    Neurological:      Mental Status: He is alert.   Psychiatric:         Attention and " Perception: Attention normal.         Mood and Affect: Mood normal.          Result Review :                Assessment and Plan       Right wrist pain  X-ray ordered today  Referral to hand specialist  Orders:    XR Wrist 3+ View Right; Future    XR Hand 3+ View Right; Future    Ambulatory Referral to Hand Surgery    Right hand pain  X-ray ordered today  Referral to hand specialist  Orders:    XR Wrist 3+ View Right; Future    XR Hand 3+ View Right; Future    Ambulatory Referral to Hand Surgery    Chronic right shoulder pain  Will refer back to Ortho at patient request  Has had x-rays in the past and seen Ortho  Continue to alternate ice and heat       Neck pain  Trial Flexeril    Take all medications as prescribed.  The patient has taken Cyclobenzaprine in the past and tolerated it well with no side effects.    The patient was instructed to not drink alcohol and to not drive while taking this medication.  -Increase daily water intake  -Return to the office for worsening signs or symptoms, fever, chills, pain going down your legs, or if symptoms do not improve after 2 weeks.    -Seek immediate medical attention for loss of bladder or bowel function, weakness or numbness in arms or legs, nausea, vomiting, abdominal pain, or syncope.  -The patient verbalized understanding of all instructions given today.                         Follow Up   Return if symptoms worsen or fail to improve, for Follow up with PCP as Directed.  Patient was given instructions and counseling regarding his condition or for health maintenance advice. Please see specific information pulled into the AVS if appropriate.

## 2025-02-26 ENCOUNTER — PATIENT ROUNDING (BHMG ONLY) (OUTPATIENT)
Dept: FAMILY MEDICINE CLINIC | Facility: CLINIC | Age: 30
End: 2025-02-26
Payer: COMMERCIAL

## 2025-02-26 NOTE — PROGRESS NOTES
A My-Chart message has been sent to the patient for [place] [facility]

## 2025-08-23 ENCOUNTER — HOSPITAL ENCOUNTER (EMERGENCY)
Facility: HOSPITAL | Age: 30
Discharge: HOME | End: 2025-08-23
Attending: STUDENT IN AN ORGANIZED HEALTH CARE EDUCATION/TRAINING PROGRAM
Payer: OTHER MISCELLANEOUS

## 2025-08-25 ENCOUNTER — OFFICE VISIT (OUTPATIENT)
Dept: ORTHOPEDIC SURGERY | Facility: CLINIC | Age: 30
End: 2025-08-25
Payer: COMMERCIAL

## 2025-08-25 VITALS — WEIGHT: 195.6 LBS | BODY MASS INDEX: 23.82 KG/M2 | HEIGHT: 76 IN | TEMPERATURE: 97 F

## 2025-08-25 DIAGNOSIS — M75.41 IMPINGEMENT SYNDROME OF RIGHT SHOULDER: Primary | ICD-10-CM

## 2025-08-25 DIAGNOSIS — S29.011A PECTORALIS MUSCLE STRAIN, INITIAL ENCOUNTER: ICD-10-CM

## 2025-08-25 PROCEDURE — 73030 X-RAY EXAM OF SHOULDER: CPT | Performed by: ORTHOPAEDIC SURGERY

## 2025-08-25 PROCEDURE — 99204 OFFICE O/P NEW MOD 45 MIN: CPT | Performed by: ORTHOPAEDIC SURGERY

## 2025-08-25 RX ORDER — MELOXICAM 15 MG/1
TABLET ORAL
Qty: 30 TABLET | Refills: 0 | Status: SHIPPED | OUTPATIENT
Start: 2025-08-25

## 2025-08-29 ENCOUNTER — PATIENT ROUNDING (BHMG ONLY) (OUTPATIENT)
Dept: ORTHOPEDIC SURGERY | Facility: CLINIC | Age: 30
End: 2025-08-29
Payer: COMMERCIAL